# Patient Record
Sex: MALE
[De-identification: names, ages, dates, MRNs, and addresses within clinical notes are randomized per-mention and may not be internally consistent; named-entity substitution may affect disease eponyms.]

---

## 2021-10-16 ENCOUNTER — HOSPITAL ENCOUNTER (EMERGENCY)
Dept: HOSPITAL 41 - JD.ED | Age: 56
LOS: 1 days | Discharge: HOME | End: 2021-10-17
Payer: SELF-PAY
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
## 2021-10-16 DIAGNOSIS — S68.620A: ICD-10-CM

## 2021-10-16 DIAGNOSIS — S68.622A: Primary | ICD-10-CM

## 2021-10-16 DIAGNOSIS — Z72.0: ICD-10-CM

## 2021-10-16 DIAGNOSIS — Z23: ICD-10-CM

## 2021-10-16 DIAGNOSIS — S61.212A: ICD-10-CM

## 2021-10-16 DIAGNOSIS — Z20.822: ICD-10-CM

## 2021-10-16 PROCEDURE — 90715 TDAP VACCINE 7 YRS/> IM: CPT

## 2021-10-16 PROCEDURE — 96374 THER/PROPH/DIAG INJ IV PUSH: CPT

## 2021-10-16 PROCEDURE — 12002 RPR S/N/AX/GEN/TRNK2.6-7.5CM: CPT

## 2021-10-16 PROCEDURE — 90471 IMMUNIZATION ADMIN: CPT

## 2021-10-16 PROCEDURE — 73130 X-RAY EXAM OF HAND: CPT

## 2021-10-16 PROCEDURE — 87635 SARS-COV-2 COVID-19 AMP PRB: CPT

## 2021-10-16 PROCEDURE — U0002 COVID-19 LAB TEST NON-CDC: HCPCS

## 2021-10-16 PROCEDURE — 99283 EMERGENCY DEPT VISIT LOW MDM: CPT

## 2021-10-16 PROCEDURE — 96376 TX/PRO/DX INJ SAME DRUG ADON: CPT

## 2021-10-16 NOTE — EDM.PDOC
ED HPI GENERAL MEDICAL PROBLEM





- General


Chief Complaint: Upper Extremity Injury/Pain


Stated Complaint: TWO SEVERED FINGERS


Time Seen by Provider: 10/16/21 20:40


Source of Information: Reports: Patient


History Limitations: Reports: No Limitations





- History of Present Illness


INITIAL COMMENTS - FREE TEXT/NARRATIVE: 





Patient is a 55-year-old male that is right-hand dominant presenting with a 

chief complaint of finger amputation.  Patient has no significant medical 

problems.  Onset of injury occurred about 30 minutes prior to arrival in the 

emergency room.  Patient was working with a  when he cut his index 

and middle finger of his right hand.  Patient immediately applied direct 

pressure came to the emergency room.  No other interventions performed prior to 

arrival.  Patient did bring fingertips with him on ice.  Any additional 

injuries.  Pain is severe.  Nothing makes symptoms better or worse.





- Related Data


                                    Allergies











Allergy/AdvReac Type Severity Reaction Status Date / Time


 


No Known Allergies Allergy   Verified 10/16/21 20:18











Home Meds: 


                                    Home Meds





Ketoconazole [Nizoral 2% Crm] 1 applic TOP BID #1 gm 10/17/21 [Rx]











Past Medical History


Musculoskeletal History: Reports: Amputation





- Past Surgical History


GI Surgical History: Reports: Hernia Repair/Other





Social & Family History





- Tobacco Use


Tobacco Use Status *Q: Current Every Day Tobacco User


Years of Tobacco use: 40


Packs/Tins Daily: 0.7





- Caffeine Use


Caffeine Use: Reports: Coffee





- Recreational Drug Use


Recreational Drug Use: No





Review of Systems





- Review of Systems


Review Of Systems: See Below


Constitutional: Denies: Chills


Ears: Denies: Dizziness


Respiratory: Denies: Shortness of Breath


Cardiovascular: Denies: Chest Pain


Skin: Denies: Rash





ED EXAM, GENERAL





- Physical Exam


Exam: See Below


Free Text/Narrative:: 





I have reviewed the triage vital signs


Const: Well nourished, well developed, appears stated age


Eyes: Pupils Equal and reactive to light bilaterally, no conjunctival injection


HENT: No signs of trauma or swelling, Neck supple without meningismus 


CV: Regular Rate Rhythm, Warm, well-perfused extremities


RESP: Unlabored respiratory effort


GI: soft, non-tender, non-distended, no masses


MSK: Examination of the right hand demonstrates amputation of the right index 

and right middle fingers and around the DIP.  No other lacerations noted.  No s

welling noted.  No other areas of injury.


Skin: Warm, dry. No rashes


Neuro: Alert, CNs II-XII grossly intact. Sensation and motor function of 

extremities grossly intact.


Psych: Appropriate mood and affect.








ED TRAUMA EXTREMITY PROCEDURES





- Laceration/Wound Repair


  ** Right Distal Digit - 3rd (Middle)


Lac/Wound Length In cm: 4


Appearance: Muscle, Irregular


Distal NVT: Other (Amputation)


Anesthetic Type: Digital


Local Anesthesia - Lidocaine (Xylocaine): 1% Plain


Local Anesthetic Volume: 5cc


Skin Prep: Providone-Iodine (Betadine)


Exploration/Debridement/Repair: Minimal Debridement


Suture Size: 3-0


# of Sutures: 4 (1 figure-of-eight stitch was used with Vicryl for hemostasis)


Drain Placement: No


Sterile Dressing Applied: Provider





Course





- Vital Signs


Last Recorded V/S: 


                                Last Vital Signs











Temp  36.3 C   10/16/21 20:15


 


Pulse  85   10/16/21 20:15


 


Resp  16   10/16/21 20:15


 


BP  132/97 H  10/16/21 20:15


 


Pulse Ox  91 L  10/16/21 20:15














- Orders/Labs/Meds


Orders: 


                               Active Orders 24 hr











 Category Date Time Status


 


 Hand Comp Min 3V Rt [CR] Stat Exams  10/16/21 20:35 Taken











Labs: 


                                Laboratory Tests











  10/16/21 Range/Units





  20:24 


 


SARS-CoV-2 RNA ([facility])  Negative  (NEGATIVE)  











Meds: 


Medications














Discontinued Medications














Generic Name Dose Route Start Last Admin





  Trade Name Freq  PRN Reason Stop Dose Admin


 


Diphtheria/Tetanus/Acell Pertussis  0.5 ml  10/16/21 21:48  10/16/21 22:38





  Diphtheria,Pertussis(Acell),Tetanus Vaccine 0.5 Ml Syringe  IM  10/16/21 21:49

  0.5 ml





  .ONCE ONE   Administration


 


Lidocaine HCl  20 ml  10/16/21 22:22  10/17/21 00:58





  Lidocaine 1% 20 Ml Mdv  INJECT  10/16/21 22:23  Not Given





  ONETIME ONE  


 


Lidocaine HCl  Confirm  10/16/21 22:27  10/17/21 00:58





  Lidocaine 1% 10 Ml Mdv  Administered  10/16/21 22:28  Not Given





  Dose  





  20 ml  





  .ROUTE  





  .STK-MED ONE  


 


Lidocaine HCl  20 ml  10/16/21 22:41  10/17/21 00:59





  Lidocaine 1% 10 Ml Mdv  INJECT  10/16/21 22:42  10 ml





  ONETIME ONE   Administration


 


Morphine Sulfate  6 mg  10/16/21 20:35  10/16/21 20:39





  Morphine 4 Mg/Ml Syringe  IVPUSH  10/16/21 20:36  6 mg





  ONETIME ONE   Administration


 


Morphine Sulfate  2 mg  10/16/21 22:22  10/16/21 22:38





  Morphine 2 Mg/Ml Syringe  IVPUSH  10/16/21 22:23  2 mg





  ONETIME ONE   Administration














Departure





- Departure


Time of Disposition: 23:59


Disposition: Home, Self-Care 01


Clinical Impression: 


 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








- Discharge Information


Prescriptions: 


Ketoconazole [Nizoral 2% Crm] 1 applic TOP BID #1 gm


Instructions:  Living With an Amputation, Traumatic Finger Amputation


Referrals: 


PCP,None [Primary Care Provider] - 


Forms:  ED Department Discharge


Additional Instructions: 


Please follow-up with [facility] in [place].  The phone number is 

[phone].  Is important that you were seen on Monday for follow-up.  

Otherwise, continue to take antibiotics prescribed and keep wound covered/clean 

and dry.





Sepsis Event Note (ED)





- Evaluation


Sepsis Screening Result: No Definite Risk





- Focused Exam


Vital Signs: 


                                   Vital Signs











  Temp Pulse Resp BP Pulse Ox


 


 10/16/21 20:15  36.3 C  85  16  132/97 H  91 L














- My Orders


Last 24 Hours: 


My Active Orders





10/16/21 20:35


Hand Comp Min 3V Rt [CR] Stat 














- Assessment/Plan


Last 24 Hours: 


My Active Orders





10/16/21 20:35


Hand Comp Min 3V Rt [CR] Stat 











Assessment:: 





Patient is a 55-year-old male presenting to the emergency room with 2 fingers 

were partially amputated.  Patient had bleeding controlled with direct pressure 

in the emergency room.  X-rays reviewed by myself demonstrate right index finger

 suffered traumatic amputation at the level of the DIP.  Additionally, patient's

 middle finger shows evidence of traumatic amputation through the middle 

phalanx.


With this information, consulted orthopedics, on-call surgeon at [facility] 

[place].  Their orthopedic surgeon reported no on-call hand surgeon over the 

weekend but they would be happy to follow-up with the patient on Monday should 

the patient not elect for reattachment.  After lengthy discussion with patient 

regarding risks of benefits of attempt at reattachment versus more routine 

follow-up in the next 48 hours, patient elected to not to seek reattachment.  He

 understands the time sensitive nature of this decision.  Therefore, patient 

underwent washout, suture placement and dressing coverage of traumatically 

amputated fingers.  He tolerated all this well.  Hemostasis was achieved.  

Return precautions given his usual.  Patient will be discharged with close 

follow-up with hand surgery.  I explained at length that he would likely still 

need surgery to provide additional flaps for closure.  Closure was not achieved 

here due to nature of patient's wounds and need for more technical closure.


All questions were addressed and answered.  Patient agrees with plan of care.

## 2021-10-17 NOTE — CR
Right hand: 3 views of the right hand were obtained.

 

Comparison: No prior right hand study is available.

 

Soft tissue and bony amputation is seen within the distal second 

finger occurring mostly within the DIP joint.  Additional soft tissue 

and bony amputation is seen involving the distal third finger within 

the middle phalanx.

 

There is a metallic foreign body seen within the base of the second 

finger which measures 3.2 mm.

 

No additional fracture or other abnormality is appreciated.

 

Impression:

1.  Soft tissue and bony amputations within the distal right second 

and third fingers.

2.  Small metallic foreign body projected within the base of the right

 second finger.

 

Diagnostic code #3